# Patient Record
Sex: MALE | Race: WHITE | NOT HISPANIC OR LATINO | URBAN - METROPOLITAN AREA
[De-identification: names, ages, dates, MRNs, and addresses within clinical notes are randomized per-mention and may not be internally consistent; named-entity substitution may affect disease eponyms.]

---
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
## 2025-03-28 NOTE — ED ADULT NURSE NOTE - NSNEUBEH_NEU_P_CORE
----- Message from Curry Monroy MD sent at 3/27/2025  4:40 PM CDT -----  Blood work looks good  ----- Message -----  From: [place]  Sent: 2/27/2025  12:16 AM CDT  To: Curry Monroy MD    
I called patient to inform of results. I was not successful at reaching him . I was able to leave  a vm to call back.   
no

## 2025-04-18 NOTE — ED PEDIATRIC TRIAGE NOTE - NS ED NOTE AC HIGH RISK COUNTRIES
No Pt presents to ED c/o left sided chest pain radiating to left arm that started yesterday. Pt also endorses dizziness, lightheadedness and shortness of breath. Denies palpitations.